# Patient Record
(demographics unavailable — no encounter records)

---

## 2025-04-15 NOTE — PLAN
[FreeTextEntry1] : Patient had normal annual gynecological follow-up. Pap smear and HPV testing were done. Postop examination instructed. Birth control options with weaning reviewed with patient we will continue with minipill at this time. Coital discomfort and dyspareunia postpartum.  Examination is normal.  Told patient that postpartum is often a decrease in estrogen with nursing and various lubrication and coital techniques were reviewed with the patient. Patient will follow-up if no improvement noted.  There is no evidence of any infectious process at this time.   Patient will continue with trazodone under the guidance of her PCP and the patient also sees a therapist at [facility] weekly. PHQ 9 depression screening was reviewed with patient for total of 5 minutes patient's goal was for patient has follow-up as noted above Patient had a  home birth. With the midwifery service.  Risks of  were reviewed with patient and the patient was advised against having a vaginal delivery at home after having a prior  section.

## 2025-04-15 NOTE — PHYSICAL EXAM
[Chaperoned Physical Exam] : A chaperone was present in the examining room during all aspects of the physical examination. [MA] : MA [Appropriately responsive] : appropriately responsive [Alert] : alert [No Acute Distress] : no acute distress [No Lymphadenopathy] : no lymphadenopathy [Soft] : soft [Non-tender] : non-tender [Non-distended] : non-distended [No HSM] : No HSM [No Lesions] : no lesions [No Mass] : no mass [Oriented x3] : oriented x3 [Examination Of The Breasts] : a normal appearance [No Masses] : no breast masses were palpable [Labia Majora] : normal [Labia Minora] : normal [Normal] : normal [Anteversion] : anteverted [Uterine Adnexae] : normal [FreeTextEntry2] : CR

## 2025-04-15 NOTE — HISTORY OF PRESENT ILLNESS
[FreeTextEntry1] : Patient is a 32-year-old here for an annual gynecological examination. The patient is currently 4 months postpartum nursing every 2-3 hours on the minipill and is amenorrheic. Patient notes some vaginal discomfort with coitus. Patient is currently on trazodone to help with sleep 100 mg nightly which she takes nightly.  This was okayed by pediatrician as she is nursing. The prescription was given by her PCP.

## 2025-07-28 NOTE — PHYSICAL EXAM
[Chaperoned Physical Exam] : A chaperone was present in the examining room during all aspects of the physical examination. [MA] : MA [FreeTextEntry2] : Lion

## 2025-07-29 NOTE — PLAN
[FreeTextEntry1] : Patient here for gynecological follow-up with vaginal discomfort and findings consistent with candidal vaginitis. SANGITA T to culture was done. GC chlamydia cultures done. Prescription for Mycolog and Diflucan was sent to the pharmacy. Hygiene: Hygiene reviewed with the patient. Patient will follow-up as needed

## 2025-07-29 NOTE — PHYSICAL EXAM
[Chaperoned Physical Exam] : A chaperone was present in the examining room during all aspects of the physical examination. [MA] : MA [FreeTextEntry2] : [place] [Appropriately responsive] : appropriately responsive [Alert] : alert [No Acute Distress] : no acute distress [No Lymphadenopathy] : no lymphadenopathy [Soft] : soft [Non-tender] : non-tender [Non-distended] : non-distended [No HSM] : No HSM [No Lesions] : no lesions [No Mass] : no mass [Oriented x3] : oriented x3 [Vulvitis] : vulvitis [No Bleeding] : There was no active vaginal bleeding [Normal] : normal [Anteversion] : anteverted [FreeTextEntry4] : Curdy discharge consistent with yeast

## 2025-07-29 NOTE — HISTORY OF PRESENT ILLNESS
[FreeTextEntry1] : Patient is a 32-year-old here for gynecological follow-up noting persistent vaginal itching and burning increasing with coitus.   Patient with increased discharge no odor. Patient has not been on antibiotics. Patient is currently 10 months postpartum nursing regularly on the minipill and amenorrheic No urinary complaints